# Patient Record
Sex: FEMALE | Race: OTHER | NOT HISPANIC OR LATINO | Employment: UNEMPLOYED | URBAN - METROPOLITAN AREA
[De-identification: names, ages, dates, MRNs, and addresses within clinical notes are randomized per-mention and may not be internally consistent; named-entity substitution may affect disease eponyms.]

---

## 2018-07-18 LAB — EXTERNAL HIV CONFIRMATION: NORMAL

## 2021-06-21 ENCOUNTER — VBI (OUTPATIENT)
Dept: ADMINISTRATIVE | Facility: OTHER | Age: 36
End: 2021-06-21

## 2021-06-21 NOTE — TELEPHONE ENCOUNTER
Upon review of the In Basket request we were able to locate, review, and update the patient chart as requested for HIV and Pap Smear (HPV) aka Cervical Cancer Screening  Any additional questions or concerns should be emailed to the Practice Liaisons via [email or web address] email, please do not reply via In Basket      Thank you  Michelle Jackson

## 2021-09-16 NOTE — PATIENT INSTRUCTIONS
- Maintain healthy weight with BMI ideally between 18-25     - Eat a healthy diet, including multiple servings of vegetables and fruits, as well as lean protein sources  - Get at least 150 minutes of moderate aerobic activity or 75 minutes of vigorous aerobic activity a week, or a combination of moderate and vigorous activity  Greater amounts of exercise will provide an even greater health benefit  - Ensure diet provides 1200mg of Calcium daily (divided) and 800IU of vitamin D, or take supplements to meet this 
No complaints

## 2021-09-17 ENCOUNTER — ANNUAL EXAM (OUTPATIENT)
Dept: OBGYN CLINIC | Facility: CLINIC | Age: 36
End: 2021-09-17
Payer: COMMERCIAL

## 2021-09-17 VITALS
WEIGHT: 163.2 LBS | DIASTOLIC BLOOD PRESSURE: 58 MMHG | BODY MASS INDEX: 28.92 KG/M2 | SYSTOLIC BLOOD PRESSURE: 90 MMHG | HEIGHT: 63 IN

## 2021-09-17 DIAGNOSIS — Z30.432 ENCOUNTER FOR IUD REMOVAL: ICD-10-CM

## 2021-09-17 DIAGNOSIS — Z12.4 CERVICAL CANCER SCREENING: ICD-10-CM

## 2021-09-17 DIAGNOSIS — Z01.419 ENCOUNTER FOR ANNUAL ROUTINE GYNECOLOGICAL EXAMINATION: Primary | ICD-10-CM

## 2021-09-17 PROCEDURE — S0612 ANNUAL GYNECOLOGICAL EXAMINA: HCPCS | Performed by: OBSTETRICS & GYNECOLOGY

## 2021-09-17 PROCEDURE — G0476 HPV COMBO ASSAY CA SCREEN: HCPCS | Performed by: OBSTETRICS & GYNECOLOGY

## 2021-09-17 PROCEDURE — 58301 REMOVE INTRAUTERINE DEVICE: CPT | Performed by: OBSTETRICS & GYNECOLOGY

## 2021-09-17 PROCEDURE — G0145 SCR C/V CYTO,THINLAYER,RESCR: HCPCS | Performed by: OBSTETRICS & GYNECOLOGY

## 2021-09-17 RX ORDER — OMEGA-3 FATTY ACIDS/FISH OIL 300-1000MG
3 CAPSULE ORAL AS NEEDED
COMMUNITY

## 2021-09-17 NOTE — PROGRESS NOTES
Annual Wellness Visit  [address]   [address] [facility], [place], [place] [address]    ASSESSMENT/PLAN: Ольга Saul is a 38 y o   who presents for annual gynecologic exam     Encounter for routine gynecologic examination  - Routine well woman exam completed today  - Cervical Cancer Screening: Current ASCCP Guidelines reviewed  Last Pap: 10/17/2017 normal  Next Pap Due: today, routine   - HPV Vaccination status: Not immunized  - STI screening offered including HIV testing: offered, pt declined  - Contraceptive counseling discussed  Current contraception: vasectomy, IUD,   - The following were reviewed in today's visit: exercise and healthy diet    Additional problems addressed during this visit:  1  Encounter for annual routine gynecological examination    2  Cervical cancer screening  -     Liquid-based pap, screening    3  Encounter for IUD removal  Comments:  pt requests IUD removal today,  with vasectomy  Orders:  -     Iud removal    Pt asking about "checking hormones", discussed pros/cons, after IUD removal monitor cycles, if she still wants done - call me and we'll discuss  Next visit: 1 year wellness      CC:  Annual Gynecologic Examination    HPI: Ольга Saul is a 38 y o   who presents for annual gynecologic examination  She denies any breast, urinary or pelvic issues at today's visit  No periods with IUD   also got vasectomy  Wishes IUD removed  Pt asking about hormone testing  Says testosterone high in teens, was done due to h/o grandmother's thyroid cancer  Discussed could consider checking after removal and see what periods are like  Gyn History  No LMP recorded  (Menstrual status: Birth Control)  Last Pap: 10/17/2017 was normal    She  reports being sexually active and has had partner(s) who are Male  She reports using the following methods of birth control/protection: I U D  and Male Sterilization         OB History  Rich[place] Code    Past Medical History:  No date: Anxiety  No date: History of gestational diabetes  05/2021: Varicose veins of leg with pain, right     Past Surgical History:  09/08/2021: ABLATION SAPHENOUS VEIN W/ RFA; Right  2014: HAND SURGERY; Right      Comment:  bone shaving and tendon release     Family History   Problem Relation Age of Onset    Heart disease Father [de-identified]    Thyroid disease Maternal Grandmother     Thyroid cancer Maternal Grandmother     Liver cancer Maternal Grandmother     Leukemia Maternal Grandmother     Prostate cancer Maternal Grandfather     Arthritis Paternal Grandmother     No Known Problems Daughter     No Known Problems Daughter     Breast cancer Neg Hx     Uterine cancer Neg Hx     Ovarian cancer Neg Hx     Colon cancer Neg Hx         Social History     Tobacco Use    Smoking status: Former Smoker     Types: Cigarettes    Smokeless tobacco: Never Used   Vaping Use    Vaping Use: Never used   Substance Use Topics    Alcohol use: Yes     Comment: 1 or less per month    Drug use: Never     Comment: No use          Current Outpatient Medications:     Ibuprofen 200 MG CAPS, Take 3 capsules by mouth as needed, Disp: , Rfl:     Levonorgestrel (LILETTA) 19 5 MCG/DAY IUD IUD, 1 each by Intrauterine route once, Disp: , Rfl:     She is allergic to bactrim [sulfamethoxazole-trimethoprim]       ROS negative except as noted in HPI    Objective:  BP 90/58   Ht 5' 2 75" (1 594 m)   Wt 74 kg (163 lb 3 2 oz)   Breastfeeding No   BMI 29 14 kg/m²      Physical Exam  Constitutional:       Appearance: Normal appearance  Chest:      Breasts:         Right: No mass or tenderness  Left: No mass or tenderness  Abdominal:      Palpations: Abdomen is soft  Tenderness: There is no abdominal tenderness  Genitourinary:     General: Normal vulva  Vagina: No bleeding or lesions  Cervix: Normal       Uterus: Normal  Not tender         Adnexa:         Right: No mass or tenderness  Left: No mass or tenderness  Rectum: No external hemorrhoid  Comments: IUD string at cervix - grasped and removed w/o difficulty  Musculoskeletal:         General: Normal range of motion  Lymphadenopathy:      Upper Body:      Right upper body: No axillary adenopathy  Left upper body: No axillary adenopathy  Neurological:      Mental Status: She is alert and oriented to person, place, and time     Psychiatric:         Mood and Affect: Mood normal          Behavior: Behavior normal

## 2021-09-17 NOTE — LETTER
2021     Freda Alicia, [address] [facility]  [facility] [address]    Patient: Chanell Stockton   YOB: 1985   Date of Visit: 2021       Dear Dr Elizondo Arn: Thank you for referring Brielle Garduno to me for evaluation  Below are my notes for this consultation  If you have questions, please do not hesitate to call me  I look forward to following your patient along with you  Sincerely,        Maria Blanca MD        CC: No Recipients  Maria Blanca MD  2021  7:24 PM  Sign when Signing Visit  Annual Wellness Visit  [address]   [address] [place] [address] [facility] 1    ASSESSMENT/PLAN: Chanell Stockton is a 38 y o   who presents for annual gynecologic exam     Encounter for routine gynecologic examination  - Routine well woman exam completed today  - Cervical Cancer Screening: Current ASCCP Guidelines reviewed  Last Pap: 10/17/2017 normal  Next Pap Due: today, routine   - HPV Vaccination status: Not immunized  - STI screening offered including HIV testing: offered, pt declined  - Contraceptive counseling discussed  Current contraception: vasectomy, IUD,   - The following were reviewed in today's visit: exercise and healthy diet    Additional problems addressed during this visit:  1  Encounter for annual routine gynecological examination    2  Cervical cancer screening  -     Liquid-based pap, screening    3  Encounter for IUD removal  Comments:  pt requests IUD removal today,  with vasectomy  Orders:  -     Iud removal    Pt asking about "checking hormones", discussed pros/cons, after IUD removal monitor cycles, if she still wants done - call me and we'll discuss  Next visit: 1 year wellness      CC:  Annual Gynecologic Examination    HPI: Chanell Stockton is a 38 y o   who presents for annual gynecologic examination  She denies any breast, urinary or pelvic issues at today's visit  No periods with IUD     also got vasectomy  Wishes IUD removed  Pt asking about hormone testing  Says testosterone high in teens, was done due to h/o grandmother's thyroid cancer  Discussed could consider checking after removal and see what periods are like  Gyn History  No LMP recorded  (Menstrual status: Birth Control)  Last Pap: 10/17/2017 was normal    She  reports being sexually active and has had partner(s) who are Male  She reports using the following methods of birth control/protection: I U D  and Male Sterilization  OB History      Past Medical History:  No date: Anxiety  No date: History of gestational diabetes  2021: Varicose veins of leg with pain, right     Past Surgical History:  2021: ABLATION SAPHENOUS VEIN W/ RFA; Right  2014: HAND SURGERY; Right      Comment:  bone shaving and tendon release     Family History   Problem Relation Age of Onset    Heart disease Father [de-identified]    Thyroid disease Maternal Grandmother     Thyroid cancer Maternal Grandmother     Liver cancer Maternal Grandmother     Leukemia Maternal Grandmother     Prostate cancer Maternal Grandfather     Arthritis Paternal Grandmother     No Known Problems Daughter     No Known Problems Daughter     Breast cancer Neg Hx     Uterine cancer Neg Hx     Ovarian cancer Neg Hx     Colon cancer Neg Hx         Social History     Tobacco Use    Smoking status: Former Smoker     Types: Cigarettes    Smokeless tobacco: Never Used   Vaping Use    Vaping Use: Never used   Substance Use Topics    Alcohol use: Yes     Comment: 1 or less per month    Drug use: Never     Comment: No use          Current Outpatient Medications:     Ibuprofen 200 MG CAPS, Take 3 capsules by mouth as needed, Disp: , Rfl:     Levonorgestrel (LILETTA) 19 5 MCG/DAY IUD IUD, 1 each by Intrauterine route once, Disp: , Rfl:     She is allergic to bactrim [sulfamethoxazole-trimethoprim]       ROS negative except as noted in HPI    Objective:  BP 90/58 Ht 5' 2 75" (1 594 m)   Wt 74 kg (163 lb 3 2 oz)   Breastfeeding No   BMI 29 14 kg/m²      Physical Exam

## 2021-09-21 LAB
HPV HR 12 DNA CVX QL NAA+PROBE: NEGATIVE
HPV16 DNA CVX QL NAA+PROBE: NEGATIVE
HPV18 DNA CVX QL NAA+PROBE: NEGATIVE

## 2021-09-23 LAB
LAB AP GYN PRIMARY INTERPRETATION: NORMAL
Lab: NORMAL

## 2023-08-28 NOTE — PATIENT INSTRUCTIONS
- Maintain healthy weight with BMI ideally between 18-25.    - Eat a healthy diet, including multiple servings of vegetables and fruits, as well as lean protein sources. - Get at least 150 minutes of moderate aerobic activity or 75 minutes of vigorous aerobic activity a week, or a combination of moderate and vigorous activity. Greater amounts of exercise will provide an even greater health benefit. - Ensure diet provides 1200mg of Calcium daily (divided) and 800IU of vitamin D, or take supplements to meet this. - Safe sex practices recommended. - Resources - information on birth control and sexually transmitted infections - www.bedsider. org            - information on sexually transmitted infections - www.cdc.gov/std/     Local Weight Loss Specialists/Centers:  [facility] in [address] [place] [address] '' [place] or [email or web address]  Bo in [address] [phone] or [email or web address]  [facility] in [address] [phone] or [email or web address]

## 2023-08-30 ENCOUNTER — ANNUAL EXAM (OUTPATIENT)
Dept: OBGYN CLINIC | Facility: CLINIC | Age: 38
End: 2023-08-30
Payer: COMMERCIAL

## 2023-08-30 ENCOUNTER — APPOINTMENT (OUTPATIENT)
Dept: LAB | Facility: HOSPITAL | Age: 38
End: 2023-08-30
Payer: COMMERCIAL

## 2023-08-30 VITALS
WEIGHT: 173.4 LBS | DIASTOLIC BLOOD PRESSURE: 78 MMHG | BODY MASS INDEX: 30.72 KG/M2 | HEIGHT: 63 IN | SYSTOLIC BLOOD PRESSURE: 120 MMHG

## 2023-08-30 DIAGNOSIS — Z86.32 HISTORY OF GESTATIONAL DIABETES: ICD-10-CM

## 2023-08-30 DIAGNOSIS — N92.1 MENORRHAGIA WITH IRREGULAR CYCLE: ICD-10-CM

## 2023-08-30 DIAGNOSIS — Z12.31 ENCOUNTER FOR SCREENING MAMMOGRAM FOR MALIGNANT NEOPLASM OF BREAST: ICD-10-CM

## 2023-08-30 DIAGNOSIS — Z01.419 ENCOUNTER FOR ANNUAL ROUTINE GYNECOLOGICAL EXAMINATION: Primary | ICD-10-CM

## 2023-08-30 LAB
ERYTHROCYTE [DISTWIDTH] IN BLOOD BY AUTOMATED COUNT: 12.5 % (ref 11.6–15.1)
EST. AVERAGE GLUCOSE BLD GHB EST-MCNC: 126 MG/DL
ESTRADIOL SERPL-MCNC: 68.5 PG/ML
FERRITIN SERPL-MCNC: 32 NG/ML (ref 11–307)
FSH SERPL-ACNC: 11 MIU/ML
HBA1C MFR BLD: 6 %
HCT VFR BLD AUTO: 40 % (ref 34.8–46.1)
HGB BLD-MCNC: 13.5 G/DL (ref 11.5–15.4)
LH SERPL-ACNC: 15.1 MIU/ML
MCH RBC QN AUTO: 29.7 PG (ref 26.8–34.3)
MCHC RBC AUTO-ENTMCNC: 33.8 G/DL (ref 31.4–37.4)
MCV RBC AUTO: 88 FL (ref 82–98)
PLATELET # BLD AUTO: 263 THOUSANDS/UL (ref 149–390)
PMV BLD AUTO: 10.1 FL (ref 8.9–12.7)
PROGEST SERPL-MCNC: 0.68 NG/ML
RBC # BLD AUTO: 4.54 MILLION/UL (ref 3.81–5.12)
TSH SERPL DL<=0.05 MIU/L-ACNC: 1.18 UIU/ML (ref 0.45–4.5)
WBC # BLD AUTO: 8.14 THOUSAND/UL (ref 4.31–10.16)

## 2023-08-30 PROCEDURE — 82670 ASSAY OF TOTAL ESTRADIOL: CPT

## 2023-08-30 PROCEDURE — 83036 HEMOGLOBIN GLYCOSYLATED A1C: CPT

## 2023-08-30 PROCEDURE — 82728 ASSAY OF FERRITIN: CPT

## 2023-08-30 PROCEDURE — S0612 ANNUAL GYNECOLOGICAL EXAMINA: HCPCS | Performed by: OBSTETRICS & GYNECOLOGY

## 2023-08-30 PROCEDURE — 85027 COMPLETE CBC AUTOMATED: CPT

## 2023-08-30 PROCEDURE — 36415 COLL VENOUS BLD VENIPUNCTURE: CPT

## 2023-08-30 PROCEDURE — 83002 ASSAY OF GONADOTROPIN (LH): CPT

## 2023-08-30 PROCEDURE — 84443 ASSAY THYROID STIM HORMONE: CPT

## 2023-08-30 PROCEDURE — 84144 ASSAY OF PROGESTERONE: CPT

## 2023-08-30 PROCEDURE — 83001 ASSAY OF GONADOTROPIN (FSH): CPT

## 2023-08-30 RX ORDER — FLUTICASONE PROPIONATE 50 MCG
1 SPRAY, SUSPENSION (ML) NASAL DAILY
COMMUNITY

## 2023-08-30 RX ORDER — CETIRIZINE HYDROCHLORIDE 10 MG/1
10 TABLET ORAL DAILY
COMMUNITY

## 2023-08-30 NOTE — PROGRESS NOTES
Annual Wellness Visit  [address], [address], [facility], [address] [facility],[address]    ASSESSMENT/PLAN: Ann Skinner is a 42 y.o. [place] Fess who presents for annual gynecologic exam.    Encounter for routine gynecologic examination  - Routine well woman exam completed today. - Cervical Cancer Screening: Current ASCCP Guidelines reviewed. Last Pap: 09/17/2021 normal. Next Pap Due: 1-2 years, routine.  - STI screening offered including HIV testing: offered, pt declined  - Contraceptive counseling discussed. Current contraception: vasectomy,   - The following were reviewed in today's visit: mammography screening ordered, exercise and healthy diet    Additional problems addressed during this visit:  1. Encounter for annual routine gynecological examination    2. Encounter for screening mammogram for malignant neoplasm of breast  Comments: To have done once turns 39yo  Orders:  -     Mammo screening bilateral w 3d & cad; Future    3. Menorrhagia with irregular cycle  Assessment & Plan:  Discussed w/ patient possible causes of menorrhagia included menstrual changes, perimenopause, fibroids, endometrial polyps. Recommend evaluation with combination of ultrasound and labs. If all normal will then discuss if she wishes treatment or is okay watching. Will communicate through [address] [place] [address]. Orders:  -     TSH, 3rd generation with Free T4 reflex; Future  -     Luteinizing hormone; Future  -     Follicle stimulating hormone; Future  -     Estradiol; Future  -     Progesterone; Future  -     CBC and Platelet; Future  -     Ferritin; Future  -     US pelvis complete w transvaginal; Future    4. History of gestational diabetes  -     Hemoglobin A1C; Future      Next visit: 1 year Wellness      CC:  Annual Gynecologic Examination    HPI: Ann Skinner is a 42 y.o. [place] Fess who presents for annual gynecologic examination. She denies any breast, urinary or pelvic issues at today's visit.     IUD removed 2021 b/c  with vasectomy. Periods where initially regular about monthly and "normal". Not heavy the whole time. Over last 6 months - irregular menses, 28-40 day cycles with heavy menstrual flow for a couple days and clots some months. More painful. Feeling tired a lot and headaches more than before - unsure if cyclic. Has gained 15 lbs over the past year - trying to eat better. Trying to exercise but not doing regularly. Sexually active. Partner with vasectomy. No issues. Gyn History  Patient's last menstrual period was 2023 (exact date). Last Pap: 2021 was normal    She  reports being sexually active and has had partner(s) who are male. She reports using the following method of birth control/protection: Male Sterilization. OB History      Past Medical History:  No date: Allergies  No date: Anxiety  No date: History of gestational diabetes  2021: Varicose veins of leg with pain, right     Past Surgical History:  2021: ABLATION SAPHENOUS VEIN W/ RFA; Right  2014: HAND SURGERY;  Right      Comment:  bone shaving and tendon release  : PHOTOREFRACTIVE KERATOTOMY      Comment:  eye sight adjustment     Family History   Problem Relation Age of Onset   • Heart disease Father 80   • Heart failure Father    • Thyroid disease Maternal Grandmother    • Thyroid cancer Maternal Grandmother    • Liver cancer Maternal Grandmother    • Leukemia Maternal Grandmother    • Prostate cancer Maternal Grandfather    • Arthritis Paternal Grandmother    • No Known Problems Daughter    • No Known Problems Daughter    • Breast cancer Neg Hx    • Uterine cancer Neg Hx    • Ovarian cancer Neg Hx    • Colon cancer Neg Hx         Social History     Tobacco Use   • Smoking status: Former     Types: Cigarettes   • Smokeless tobacco: Never   • Tobacco comments:     Social smoker in early    Vaping Use   • Vaping Use: Never used   Substance Use Topics   • Alcohol use: Yes     Comment: One drink less than once a week   • Drug use: Never     Comment: No use          Current Outpatient Medications:   •  cetirizine (ZyrTEC) 10 mg tablet, Take 10 mg by mouth daily, Disp: , Rfl:   •  fluticasone (FLONASE) 50 mcg/act nasal spray, 1 spray into each nostril daily, Disp: , Rfl:     She is allergic to bactrim [sulfamethoxazole-trimethoprim]. .    ROS negative except as noted in HPI    Objective:  /78   Ht 5' 2.5" (1.588 m)   Wt 78.7 kg (173 lb 6.4 oz)   LMP 08/16/2023 (Exact Date)   Breastfeeding No   BMI 31.21 kg/m²      Physical Exam  Constitutional:       Appearance: Normal appearance. Chest:   Breasts:     Right: Normal. No mass or tenderness. Left: Normal. No mass or tenderness. Abdominal:      Palpations: Abdomen is soft. Tenderness: There is no abdominal tenderness. Genitourinary:     General: Normal vulva. Vagina: No bleeding or lesions. Cervix: Normal.      Uterus: Normal. Not tender. Adnexa:         Right: No mass or tenderness. Left: No mass or tenderness. Rectum: No external hemorrhoid. Musculoskeletal:         General: Normal range of motion. Lymphadenopathy:      Upper Body:      Right upper body: No axillary adenopathy. Left upper body: No axillary adenopathy. Neurological:      Mental Status: She is alert and oriented to person, place, and time.    Psychiatric:         Mood and Affect: Mood normal.         Behavior: Behavior normal.

## 2023-08-30 NOTE — LETTER
2023     Killian Cuellar, [address]    Patient: Ro Choudhury   YOB: 1985   Date of Visit: 2023       Dear Dr. Lyndsey Butler: Thank you for referring Mariano Jimenes to me for evaluation. Below are my notes for this consultation. If you have questions, please do not hesitate to call me. I look forward to following your patient along with you. Sincerely,        Eileen Lopez MD        CC: No Recipients    Eileen Lopez MD  2023  1:51 PM  Sign when Signing Visit  Annual Wellness Visit  [address]  [address] [facility], [address], [facility], [facility] [address] [facility],8    ASSESSMENT/PLAN: Ro Choudhury is a 42 y.o.  who presents for annual gynecologic exam.    Encounter for routine gynecologic examination  - Routine well woman exam completed today. - Cervical Cancer Screening: Current ASCCP Guidelines reviewed. Last Pap: 2021 normal. Next Pap Due: 1-2 years, routine.  - STI screening offered including HIV testing: offered, pt declined  - Contraceptive counseling discussed. Current contraception: vasectomy,   - The following were reviewed in today's visit: mammography screening ordered, exercise and healthy diet    Additional problems addressed during this visit:  1. Encounter for annual routine gynecological examination    2. Encounter for screening mammogram for malignant neoplasm of breast  Comments: To have done once turns 37yo  Orders:  -     Mammo screening bilateral w 3d & cad; Future    3. Menorrhagia with irregular cycle  Assessment & Plan:  Discussed w/ patient possible causes of menorrhagia included menstrual changes, perimenopause, fibroids, endometrial polyps. Recommend evaluation with combination of ultrasound and labs. If all normal will then discuss if she wishes treatment or is okay watching. Will communicate through [address] [place] [address]. Orders:  -     TSH, 3rd generation with Free T4 reflex;  Future  - Luteinizing hormone; Future  -     Follicle stimulating hormone; Future  -     Estradiol; Future  -     Progesterone; Future  -     CBC and Platelet; Future  -     Ferritin; Future  -     US pelvis complete w transvaginal; Future    4. History of gestational diabetes  -     Hemoglobin A1C; Future      Next visit: ***      CC:  Annual Gynecologic Examination    HPI: Jennifer Macedo is a 42 y.o. Mat Antu who presents for annual gynecologic examination. She denies any breast, urinary or pelvic issues at today's visit. IUD removed  b/c  with vasectomy. Periods where initially regular about monthly and "normal". Not heavy the whole time. Over last 6 months - irregular menses, 28-40 day cycles with heavy menstrual flow for a couple days and clots some months. More painful. Feeling tired a lot and headaches more than before - unsure if cyclic. Has gained 15 lbs over the past year - trying to eat better. Trying to exercise but not doing regularly. Sexually active. Partner with vasectomy. No issues. Gyn History  Patient's last menstrual period was 2023 (exact date). Last Pap: 2021 was normal    She  reports being sexually active and has had partner(s) who are male. She reports using the following method of birth control/protection: Male Sterilization. OB History      Past Medical History:  No date: Allergies  No date: Anxiety  No date: History of gestational diabetes  2021: Varicose veins of leg with pain, right     Past Surgical History:  2021: ABLATION SAPHENOUS VEIN W/ RFA; Right  2014: HAND SURGERY;  Right      Comment:  bone shaving and tendon release  : PHOTOREFRACTIVE KERATOTOMY      Comment:  eye sight adjustment     Family History   Problem Relation Age of Onset   • Heart disease Father 80   • Heart failure Father    • Thyroid disease Maternal Grandmother    • Thyroid cancer Maternal Grandmother    • Liver cancer Maternal Grandmother    • Leukemia Maternal Grandmother    • Prostate cancer Maternal Grandfather    • Arthritis Paternal Grandmother    • No Known Problems Daughter    • No Known Problems Daughter    • Breast cancer Neg Hx    • Uterine cancer Neg Hx    • Ovarian cancer Neg Hx    • Colon cancer Neg Hx         Social History     Tobacco Use   • Smoking status: Former     Types: Cigarettes   • Smokeless tobacco: Never   • Tobacco comments:     Social smoker in early 25s   Vaping Use   • Vaping Use: Never used   Substance Use Topics   • Alcohol use: Yes     Comment: One drink less than once a week   • Drug use: Never     Comment: No use          Current Outpatient Medications:   •  cetirizine (ZyrTEC) 10 mg tablet, Take 10 mg by mouth daily, Disp: , Rfl:   •  fluticasone (FLONASE) 50 mcg/act nasal spray, 1 spray into each nostril daily, Disp: , Rfl:     She is allergic to bactrim [sulfamethoxazole-trimethoprim]. .    ROS negative except as noted in HPI    Objective:  /78   Ht 5' 2.5" (1.588 m)   Wt 78.7 kg (173 lb 6.4 oz)   LMP 08/16/2023 (Exact Date)   Breastfeeding No   BMI 31.21 kg/m²     Physical Exam  Constitutional:       Appearance: Normal appearance. Chest:   Breasts:     Right: Normal. No mass or tenderness. Left: Normal. No mass or tenderness. Abdominal:      Palpations: Abdomen is soft. Tenderness: There is no abdominal tenderness. Genitourinary:     General: Normal vulva. Vagina: No bleeding or lesions. Cervix: Normal.      Uterus: Normal. Not tender. Adnexa:         Right: No mass or tenderness. Left: No mass or tenderness. Rectum: No external hemorrhoid. Musculoskeletal:         General: Normal range of motion. Lymphadenopathy:      Upper Body:      Right upper body: No axillary adenopathy. Left upper body: No axillary adenopathy. Neurological:      Mental Status: She is alert and oriented to person, place, and time.    Psychiatric:         Mood and Affect: Mood normal.         Behavior: Behavior normal.

## 2023-08-30 NOTE — LETTER
2023     [facility], [address]    Patient: Sagar España   YOB: 1985   Date of Visit: 2023       Dear Dr. Norman Figueroa: Thank you for referring Candido Barthel to me for evaluation. Below are my notes for this consultation. If you have questions, please do not hesitate to call me. I look forward to following your patient along with you. Sincerely,        Carly Horta MD        CC: No Recipients    Carly Horta MD  2023  1:51 PM  Sign when Signing Visit  Annual Wellness Visit  [address]  [address] [place] [address], [address], [facility], [address] [place] [address],8W    ASSESSMENT/PLAN: Sagar España is a 42 y.o.  who presents for annual gynecologic exam.    Encounter for routine gynecologic examination  - Routine well woman exam completed today. - Cervical Cancer Screening: Current ASCCP Guidelines reviewed. Last Pap: 2021 normal. Next Pap Due: 1-2 years, routine.  - STI screening offered including HIV testing: offered, pt declined  - Contraceptive counseling discussed. Current contraception: vasectomy,   - The following were reviewed in today's visit: mammography screening ordered, exercise and healthy diet    Additional problems addressed during this visit:  1. Encounter for annual routine gynecological examination    2. Encounter for screening mammogram for malignant neoplasm of breast  Comments: To have done once turns 37yo  Orders:  -     Mammo screening bilateral w 3d & cad; Future    3. Menorrhagia with irregular cycle  Assessment & Plan:  Discussed w/ patient possible causes of menorrhagia included menstrual changes, perimenopause, fibroids, endometrial polyps. Recommend evaluation with combination of ultrasound and labs. If all normal will then discuss if she wishes treatment or is okay watching. Will communicate through [address] [place] [address]. Orders:  -     TSH, 3rd generation with Free T4 reflex;  Future  - Luteinizing hormone; Future  -     Follicle stimulating hormone; Future  -     Estradiol; Future  -     Progesterone; Future  -     CBC and Platelet; Future  -     Ferritin; Future  -     US pelvis complete w transvaginal; Future    4. History of gestational diabetes  -     Hemoglobin A1C; Future      Next visit: 1 year Wellness      CC:  Annual Gynecologic Examination    HPI: Sera Thomas is a 42 y.o. Mary Gearing who presents for annual gynecologic examination. She denies any breast, urinary or pelvic issues at today's visit. IUD removed  b/c  with vasectomy. Periods where initially regular about monthly and "normal". Not heavy the whole time. Over last 6 months - irregular menses, 28-40 day cycles with heavy menstrual flow for a couple days and clots some months. More painful. Feeling tired a lot and headaches more than before - unsure if cyclic. Has gained 15 lbs over the past year - trying to eat better. Trying to exercise but not doing regularly. Sexually active. Partner with vasectomy. No issues. Gyn History  Patient's last menstrual period was 2023 (exact date). Last Pap: 2021 was normal    She  reports being sexually active and has had partner(s) who are male. She reports using the following method of birth control/protection: Male Sterilization. OB History      Past Medical History:  No date: Allergies  No date: Anxiety  No date: History of gestational diabetes  2021: Varicose veins of leg with pain, right     Past Surgical History:  2021: ABLATION SAPHENOUS VEIN W/ RFA; Right  2014: HAND SURGERY;  Right      Comment:  bone shaving and tendon release  : PHOTOREFRACTIVE KERATOTOMY      Comment:  eye sight adjustment     Family History   Problem Relation Age of Onset   • Heart disease Father 80   • Heart failure Father    • Thyroid disease Maternal Grandmother    • Thyroid cancer Maternal Grandmother    • Liver cancer Maternal Grandmother • Leukemia Maternal Grandmother    • Prostate cancer Maternal Grandfather    • Arthritis Paternal Grandmother    • No Known Problems Daughter    • No Known Problems Daughter    • Breast cancer Neg Hx    • Uterine cancer Neg Hx    • Ovarian cancer Neg Hx    • Colon cancer Neg Hx         Social History     Tobacco Use   • Smoking status: Former     Types: Cigarettes   • Smokeless tobacco: Never   • Tobacco comments:     Social smoker in early 25s   Vaping Use   • Vaping Use: Never used   Substance Use Topics   • Alcohol use: Yes     Comment: One drink less than once a week   • Drug use: Never     Comment: No use          Current Outpatient Medications:   •  cetirizine (ZyrTEC) 10 mg tablet, Take 10 mg by mouth daily, Disp: , Rfl:   •  fluticasone (FLONASE) 50 mcg/act nasal spray, 1 spray into each nostril daily, Disp: , Rfl:     She is allergic to bactrim [sulfamethoxazole-trimethoprim]. .    ROS negative except as noted in HPI    Objective:  /78   Ht 5' 2.5" (1.588 m)   Wt 78.7 kg (173 lb 6.4 oz)   LMP 08/16/2023 (Exact Date)   Breastfeeding No   BMI 31.21 kg/m²     Physical Exam  Constitutional:       Appearance: Normal appearance. Chest:   Breasts:     Right: Normal. No mass or tenderness. Left: Normal. No mass or tenderness. Abdominal:      Palpations: Abdomen is soft. Tenderness: There is no abdominal tenderness. Genitourinary:     General: Normal vulva. Vagina: No bleeding or lesions. Cervix: Normal.      Uterus: Normal. Not tender. Adnexa:         Right: No mass or tenderness. Left: No mass or tenderness. Rectum: No external hemorrhoid. Musculoskeletal:         General: Normal range of motion. Lymphadenopathy:      Upper Body:      Right upper body: No axillary adenopathy. Left upper body: No axillary adenopathy. Neurological:      Mental Status: She is alert and oriented to person, place, and time.    Psychiatric:         Mood and Affect: Mood normal.         Behavior: Behavior normal.

## 2023-08-30 NOTE — ASSESSMENT & PLAN NOTE
Discussed w/ patient possible causes of menorrhagia included menstrual changes, perimenopause, fibroids, endometrial polyps. Recommend evaluation with combination of ultrasound and labs. If all normal will then discuss if she wishes treatment or is okay watching. Will communicate through [address] [place] [address].

## 2023-09-21 ENCOUNTER — HOSPITAL ENCOUNTER (OUTPATIENT)
Dept: ULTRASOUND IMAGING | Facility: HOSPITAL | Age: 38
End: 2023-09-21
Attending: OBSTETRICS & GYNECOLOGY
Payer: COMMERCIAL

## 2023-09-21 DIAGNOSIS — N92.1 MENORRHAGIA WITH IRREGULAR CYCLE: ICD-10-CM

## 2023-09-21 PROCEDURE — 76856 US EXAM PELVIC COMPLETE: CPT

## 2023-09-21 PROCEDURE — 76830 TRANSVAGINAL US NON-OB: CPT

## 2023-09-27 ENCOUNTER — TELEPHONE (OUTPATIENT)
Dept: OBGYN CLINIC | Facility: CLINIC | Age: 38
End: 2023-09-27

## 2023-09-27 NOTE — TELEPHONE ENCOUNTER
Carla alford/m requesting U/S results and recommendations after completing labs and imaging.  Dr. Dasia Arevalo, please review and advise

## 2023-12-14 ENCOUNTER — HOSPITAL ENCOUNTER (OUTPATIENT)
Dept: MAMMOGRAPHY | Facility: IMAGING CENTER | Age: 38
Discharge: HOME | End: 2023-12-14
Payer: COMMERCIAL

## 2023-12-14 VITALS — HEIGHT: 63 IN | WEIGHT: 168 LBS | BODY MASS INDEX: 29.77 KG/M2

## 2023-12-14 DIAGNOSIS — Z12.31 ENCOUNTER FOR SCREENING MAMMOGRAM FOR MALIGNANT NEOPLASM OF BREAST: ICD-10-CM

## 2023-12-14 PROCEDURE — 77063 BREAST TOMOSYNTHESIS BI: CPT

## 2023-12-14 PROCEDURE — 77067 SCR MAMMO BI INCL CAD: CPT
